# Patient Record
Sex: FEMALE | Race: WHITE
[De-identification: names, ages, dates, MRNs, and addresses within clinical notes are randomized per-mention and may not be internally consistent; named-entity substitution may affect disease eponyms.]

---

## 2018-07-06 ENCOUNTER — HOSPITAL ENCOUNTER (OUTPATIENT)
Dept: HOSPITAL 80 - F3N | Age: 73
Discharge: HOME | End: 2018-07-06
Attending: ORTHOPAEDIC SURGERY
Payer: COMMERCIAL

## 2018-07-06 VITALS — DIASTOLIC BLOOD PRESSURE: 94 MMHG | SYSTOLIC BLOOD PRESSURE: 131 MMHG

## 2018-07-06 DIAGNOSIS — M79.674: ICD-10-CM

## 2018-07-06 DIAGNOSIS — M20.41: ICD-10-CM

## 2018-07-06 DIAGNOSIS — Z98.1: ICD-10-CM

## 2018-07-06 DIAGNOSIS — M96.0: Primary | ICD-10-CM

## 2018-07-06 DIAGNOSIS — Z96.653: ICD-10-CM

## 2018-07-06 PROCEDURE — 0SBP0ZX EXCISION OF RIGHT TOE PHALANGEAL JOINT, OPEN APPROACH, DIAGNOSTIC: ICD-10-PCS | Performed by: ORTHOPAEDIC SURGERY

## 2018-07-06 PROCEDURE — 11012 DEB SKIN BONE AT FX SITE: CPT

## 2018-07-06 NOTE — POSTOPPROG
Post Op Note


Date of Operation: 07/06/18


Surgeon: Chago Tanner


Assistant: gerardo


Anesthesiologist: cameron


Anesthesia: GET(General Endotracheal)


Pre-op Diagnosis: R great toe IP nonunion


Post-op Diagnosis: same


Indication: above


Procedure: I and D R great toe to bone


Inf/Abcess present in the surg proc area at time of surgery?: Yes


Depth: Deep Incisional (Fascial)


EBL: Minimal

## 2018-07-06 NOTE — GOP
[f rep st]



                                                                OPERATIVE REPORT





DATE OF OPERATION:  07/06/2018



SURGEON:  Chago Tanner MD



ASSISTANT:  Jagdish Ledbetter SA.



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



PROCEDURE PERFORMED:  Right great toe hallux interphalangeal irrigation and debridement including bon
e with cultures.



FINDINGS:  



SPECIMENS:  Tissue and swab for culture, including AFB, fungal, anaerobic and aerobic.



ESTIMATED BLOOD LOSS:  5 mL.



INDICATIONS:  This is a 72-year-old female who previously got an IP fusion and had subsequent hardwar
e difficulties.  According to her, the screw work its way out and fell out on its own.  She had a rep
eat surgery on this as well.  She was left with no hardware in the toe.  She came to me with a signif
icantly enlarged toe and significant infection given her x-rays of nonunion and her history.  She is 
not currently on antibiotics.  There is no open wound.  I discussed with her two staging this operati
on to perform an I and D and get cultures to isolate any potential infection and bacteria and treat t
his before definitively re-fusing the toe with hardware.  She would like to proceed with this course.
  We discussed the risks of nonunion, malunion, infection, need for amputation, inability to make the
 toe any smaller, shortening of the toe, nerve injury, blood clot, and anesthetic complications.  She
 elected to proceed.  Informed consent obtained. All questions were answered.



DESCRIPTION OF PROCEDURE:  She was marked preoperatively.  She was taken to the operative suite, prep
ped and draped in a normal fashion.  Time-out was performed verifying site, side, location, and there
 was agreement with the team.  A tourniquet was utilized and no antibiotics were given.  Incision was
 made over the great toe.  Significant amounts of scarring portion.  This was debrided.  There was a 
clear nonunion with significant scar.  I did debride this tissue and sent it off for culture includin
g bone from both the hallux, proximal and distal phalanx.  I had curetted out the old screw hole both
 proximally and distally.  I did not find any gross pus or infected looking tissue.  However, there w
as abundant scar.  I released the joint to further mobilize this as well as the MTP joint to mobilize
 this.  I contoured the toes best as I could.  She was thoroughly irrigated with normal saline.  Anti
biotics were given after culture specimens were sent, 2 g of Ancef.  She was closed with 0 PDS, 2-0 P
DS and 3-0 nylon.  She was taken to PACU in stable condition.  We will await the results of the cultu
res.  She is sent home on clindamycin as there is a Bactrim interaction with her home medications.  S
he will see Infectious Disease next week for potential treatment of this pending cultures and we will
 plan revision accordingly.



IMPLANTS:  None.



COMPLICATIONS:  None.



DRAINS:  None.



CONDITION:  Stable.





Job #:  [id number]

## 2018-07-06 NOTE — PDANEPAE
ANE History of Present Illness


rightgreat toe I&D








ANE Past Medical History





- Cardiovascular History


Hx Hypertension: Yes


Hx Arrhythmias: No


Hx Chest Pain: No


Hx Coronary Artery / Peripheral Vascular Disease: No


Hx CHF / Valvular Disease: No


Hx Palpitations: No





- Pulmonary History


Hx COPD: No


Hx Asthma/Reactive Airway Disease: No


Hx Recent Upper Respiratory Infection: No


Hx Oxygen in Use at Home: No


Hx Sleep Apnea: No





- Neurologic History


Hx Cerebrovascular Accident: No


Hx Seizures: No


Hx Dementia: No





- Endocrine History


Hx Diabetes: No


Hypothyroid: Yes


Hyperthyroid: No


Endocrine History Comment: overactive thyroid treated with radioactive iodine





- Renal History


Hx Renal Disorders: No





- Liver History


Hx Hepatic Disorders: No





- Neurological & Psychiatric Hx


Hx Neurological and Psychiatric Disorders: Yes


Neurological / Psychiatric History Comment: bipolor, ect treatments 10yrs ago





- Cancer History


Hx Cancer: No





- Congenital Disorder History


Hx Congenital Disorders: No





- GI History


Hx Gastrointestinal Disorders: No





- Surgical History


Prior Surgeries: laminectomy.  gallbladder removed.  lower umbilical hernia 

repair.  upp umbiliucal hernia repair.  ganglion cyst removed rom left great 

toe.  ankle - hagland's deformity.  pinning left hip and wrist.  right hagland'

s deformity.  right and left total hips.  r foot bunionectomy, 2,3,4 toes 

straightened.  back surgery - 8 screws, 2 cages.  lumbar fusion.  right foot 

big toe staightened





ANE Review of Systems


Review of systems is: negative


Review of Systems: 








- Exercise capacity


Exercise capacity: >=4 METS


METS (RN): 4 METS





ANE Patient History





- Allergies


Allergies/Adverse Reactions: 








oxycodone [From OxyContin] Allergy (Verified 07/06/18 08:22)


 


zolpidem [From Ambien] Allergy (Verified 07/05/18 11:39)


 








- Home Medications


Home Medications: 








Cymbalta 60 MG (*)  07/06/18 [Last Taken 07/05/18]


LaMICtal 150 mg 07/06/18 [Last Taken 07/05/18]


Lisinopril 10 mg 07/06/18 [Last Taken 07/05/18]


Lithium Carbonate Cap 300 mg (*)  07/06/18 [Last Taken 07/05/18]


Lyrica 150mg (*)  07/06/18 [Last Taken 07/05/18]


Restoril 30 mg 07/06/18 [Last Taken 07/05/18]


Synthroid 150 mcg (*)  07/06/18 [Last Taken 07/05/18]


Vitamin B12 (*) IN 07/06/18 [Last Taken Unknown]








- NPO status


NPO Status: no food or drink >8 hours


NPO Since - Liquids (Date): 07/05/18


NPO Since - Liquids (Time): 22:00


NPO Since - Solids (Date): 07/05/18


NPO Since - Solids (Time): 18:00





- Anes Hx


Anes Hx: no prior problems





- Smoking Hx


Smoking Status: Never smoked





- Alcohol Use


Alcohol Use: None





- Family Anes Hx


Family Anes Hx: none





ANE Labs/Vital Signs





- Vital Signs


Blood Pressure: 131/66


Heart Rate: 64


Respiratory Rate: 16


O2 Sat (%): 91


Height: 182.88 cm


Weight: 111.584 kg





ANE Physical Exam





- Airway


Neck exam: FROM


Mallampati Score: Class 2





- Pulmonary


Pulmonary: no respiratory distress





- Cardiovascular


Cardiovascular: regular rate and rhythym





- ASA Status


ASA Status: II





ANE Anesthesia Plan


Anesthesia Plan: GA w LMA

## 2018-07-17 NOTE — GDS
[f 
rep st]



                                                             DISCHARGE SUMMARY





The patient was taken back to the operating room for a debridement of her great 
toe.  She was recovered well in the PACU, briefly went to the floor and was 
able to go home the same day.  She was sent home on antibiotics of clindamycin.
  She was sent on her previous medications.  She was made heel weightbearing on 
this.  She has follow up scheduled with myself and with Infectious Disease 
doctors in the following week.  She will call or come back to the hospital if 
she has chest pain, shortness of breath, or other questions.





Job #:  [id number]

MTDD

## 2018-08-03 ENCOUNTER — HOSPITAL ENCOUNTER (OUTPATIENT)
Dept: HOSPITAL 80 - FSGY | Age: 73
Discharge: HOME | End: 2018-08-03
Attending: ORTHOPAEDIC SURGERY
Payer: COMMERCIAL

## 2018-08-03 VITALS — DIASTOLIC BLOOD PRESSURE: 66 MMHG | SYSTOLIC BLOOD PRESSURE: 148 MMHG

## 2018-08-03 DIAGNOSIS — M96.0: Primary | ICD-10-CM

## 2018-08-03 DIAGNOSIS — M62.471: ICD-10-CM

## 2018-08-03 PROCEDURE — C1762 CONN TISS, HUMAN(INC FASCIA): HCPCS

## 2018-08-03 PROCEDURE — C1713 ANCHOR/SCREW BN/BN,TIS/BN: HCPCS

## 2018-08-03 PROCEDURE — 0SGP0KZ FUSION OF RIGHT TOE PHALANGEAL JOINT WITH NONAUTOLOGOUS TISSUE SUBSTITUTE, OPEN APPROACH: ICD-10-PCS | Performed by: ORTHOPAEDIC SURGERY

## 2018-08-03 PROCEDURE — 0SNM0ZZ RELEASE RIGHT METATARSAL-PHALANGEAL JOINT, OPEN APPROACH: ICD-10-PCS | Performed by: ORTHOPAEDIC SURGERY

## 2018-08-03 NOTE — GOP
[f 
rep st]



                                                                OPERATIVE REPORT





DATE OF OPERATION:  08/03/2018



SURGEON:  Chago Tanner MD



ASSISTANT:  Jagdish Ledbetter.



ANESTHESIA:  General.



PREOPERATIVE DIAGNOSIS:  Right great toe interphalangeal nonunion and right 
great toe metatarsophalangeal dorsiflexion contracture.



POSTOPERATIVE DIAGNOSIS:  Right great toe interphalangeal nonunion and right 
great toe metatarsophalangeal dorsiflexion contracture.



PROCEDURE PERFORMED:  

1.  Right great toe interphalangeal revision fusion with bone graft.

2.  Right great metatarsophalangeal 1st capsulotomy.



FINDINGS:  



SPECIMENS:  None.



ESTIMATED BLOOD LOSS:  5 mL.



INDICATIONS:  72-year-old female who had this significant nonunion.  I was 
originally concerned about infection.  4 weeks ago I performed an I and D with 
cultures, and her cultures remained negative the last few weeks.  Her swelling 
did get somewhat better, though her toe is still enlarged.  We elected for the 
revision fusion.  We discussed risks of nonunion, malunion, continued pain.  We 
also discussed a distal toe amputation.  I discussed the need for external pins
, and she elected to proceed.  Informed consent.  All questions answered and 
marked preoperatively.



DESCRIPTION OF PROCEDURE:  She was taken to the operative suite.  She was 
sterilely prepped and draped in normal fashion.  Time-out was performed 
verifying the site, side, location, and there was agreement by all members of 
the team.  I made an incision where my old incision was.  I dissected down to 
the joint.  I had previously prepared this joint during the debridement, but I 
re-prepared it, getting down to subchondral bone, drilling holes in the bone 
and removing any scar tissue that had formed.  I also did an MTP capsulotomy of 
the dorsal capsule using McGlamry to remove some of the extension contracture 
of this.  I then placed the toe in a correct position, placed guidewires across 
this, repositioned the toe in a little bit of flexion to help correct her 
deformity, and then drilled and put a 4-0 headless compression screw across 
this in a different hole than she had prior.  I filled the holes with the DBX 
bone graft before doing this as well as the joint.  I then placed a second 
screw across this obtaining good correction and compression.  Joint was well 
bone grafted.  It was stable.  She was irrigated, closed with 0 Vicryl, 2-0 
Vicryl, 3-0 nylon.  She was taken to PACU in stable condition and placed in a 
splint.



COMPLICATIONS:  None.



DRAINS:  None.



CONDITION:  Stable.





Job #:  [id number]

MTDD

## 2018-08-03 NOTE — PDANEPAE
ANE History of Present Illness





R toe pain 





ANE Past Medical History





- Cardiovascular History


Hx Hypertension: Yes


Hx Arrhythmias: No


Hx Chest Pain: No


Hx Coronary Artery / Peripheral Vascular Disease: No


Hx CHF / Valvular Disease: No


Hx Palpitations: No





- Pulmonary History


Hx COPD: No


Hx Asthma/Reactive Airway Disease: No


Hx Recent Upper Respiratory Infection: No


Hx Oxygen in Use at Home: No


Hx Sleep Apnea: No


Sleep Apnea Screening Result - Last Documented: Negative





- Neurologic History


Hx Cerebrovascular Accident: No


Hx Seizures: No


Hx Dementia: No





- Endocrine History


Hx Diabetes: No


Obesity: yes


Endocrine History Comment: overactive thyroid treated with radioactive iodine





- Renal History


Hx Renal Disorders: No





- Liver History


Hx Hepatic Disorders: No





- Neurological & Psychiatric Hx


Hx Neurological and Psychiatric Disorders: Yes


Neurological / Psychiatric History Comment: bipolor, ect treatments 10yrs ago





- Cancer History


Hx Cancer: No





- Congenital Disorder History


Hx Congenital Disorders: No





- GI History


GERD: no


Hx Gastrointestinal Disorders: No





- Other Health History


Other Health History: none





- Chronic Pain History


Chronic Pain: No





- Surgical History


Prior Surgeries: laminectomy.  gallbladder removed.  lower umbilical hernia 

repair.  upp umbiliucal hernia repair.  ganglion cyst removed rom left great 

toe.  ankle - hagland's deformity.  pinning left hip and wrist.  right hagland'

s deformity.  right and left total hips.  r foot bunionectomy, 2,3,4 toes 

straightened.  back surgery - 8 screws, 2 cages.  lumbar fusion.  right foot 

big toe staightened





ANE Review of Systems


Review of Systems: 








- Exercise capacity


METS (RN): 4 METS





ANE Patient History





- Allergies


Allergies/Adverse Reactions: 








oxycodone [From OxyContin] Allergy (Verified 07/25/18 12:20)


 Other-Enter Comments


zolpidem [From Ambien] Allergy (Verified 07/25/18 12:20)


 Other-Enter Comments








- Home Medications


Home medications: home medication list seen and reviewed


Home Medications: 








LYRICA  07/20/18 [Last Taken 08/02/18 22:00]


LaMICtal  07/20/18 [Last Taken 08/02/18 09:00]


Lithium Carbonate  07/20/18 [Last Taken 08/02/18 22:00]


B-12  07/25/18 [Last Taken 08/01/18]


Cymbalta  07/25/18 [Last Taken 08/02/18 09:00]


Levothyroxine  07/25/18 [Last Taken 08/02/18 09:00]


Lisinopril  07/25/18 [Last Taken 08/02/18 09:00]


Restoril  07/25/18 [Last Taken 08/02/18 23:00]








- NPO status


NPO Since - Liquids (Date): 08/02/18


NPO Since - Liquids (Time): 22:00


NPO Since - Solids (Date): 08/02/18


NPO Since - Solids (Time): 22:00





- Anes Hx


Anes Hx: no prior problems





- Smoking Hx


Smoking Status: Never smoked





- Family Anes Hx


Family Hx Anesthesia Complications: none





ANE Labs/Vital Signs





- Vital Signs


Blood Pressure: 135/87


Heart Rate: 63


Respiratory Rate: 16


O2 Sat (%): 94


Height: 182.88 cm


Weight: 111.584 kg





ANE Physical Exam





- Airway


Neck exam: FROM


Mallampati Score: Class 2


Mouth exam: normal dental/mouth exam





- Pulmonary


Pulmonary: no respiratory distress, no rales or rhonchi





- Cardiovascular


Cardiovascular: regular rate and rhythym, no murmur, rub, or gallop





- ASA Status


ASA Status: II





ANE Anesthesia Plan


Anesthesia Plan: GA w LMA

## 2018-08-03 NOTE — POSTOPPROG
Post Op Note


Date of Operation: 08/03/18


Surgeon: Chago Tanner


Assistant: Anatoly


Pre-op Diagnosis: R great toe non union


Post-op Diagnosis: same


Indication: above


Procedure: IP fusion great toe, MTP capsulotomy


Inf/Abcess present in the surg proc area at time of surgery?: No


EBL: Minimal